# Patient Record
Sex: MALE | Race: WHITE | NOT HISPANIC OR LATINO | Employment: UNEMPLOYED
[De-identification: names, ages, dates, MRNs, and addresses within clinical notes are randomized per-mention and may not be internally consistent; named-entity substitution may affect disease eponyms.]

---

## 2017-09-12 ENCOUNTER — HOSPITAL (OUTPATIENT)
Dept: OTHER | Age: 37
End: 2017-09-12
Attending: SPECIALIST

## 2017-09-13 ENCOUNTER — APPOINTMENT (OUTPATIENT)
Dept: GENERAL RADIOLOGY | Facility: HOSPITAL | Age: 37
End: 2017-09-13
Attending: EMERGENCY MEDICINE
Payer: MEDICAID

## 2017-09-13 ENCOUNTER — HOSPITAL ENCOUNTER (EMERGENCY)
Facility: HOSPITAL | Age: 37
Discharge: HOME | End: 2017-09-13
Attending: EMERGENCY MEDICINE
Payer: MEDICAID

## 2017-09-13 VITALS
WEIGHT: 250 LBS | SYSTOLIC BLOOD PRESSURE: 118 MMHG | OXYGEN SATURATION: 100 % | TEMPERATURE: 97 F | BODY MASS INDEX: 32.08 KG/M2 | HEIGHT: 74 IN | HEART RATE: 63 BPM | RESPIRATION RATE: 17 BRPM | DIASTOLIC BLOOD PRESSURE: 71 MMHG

## 2017-09-13 DIAGNOSIS — R07.89 CHEST PAIN, ATYPICAL: Primary | ICD-10-CM

## 2017-09-13 LAB
ALBUMIN SERPL-MCNC: 4.4 G/DL (ref 3.5–4.8)
ALP LIVER SERPL-CCNC: 70 U/L (ref 45–117)
ALT SERPL-CCNC: 92 U/L (ref 17–63)
APTT PPP: 36.7 SECONDS (ref 25–34)
AST SERPL-CCNC: 35 U/L (ref 15–41)
ATRIAL RATE: 77 BPM
BASOPHILS # BLD AUTO: 0.02 X10(3) UL (ref 0–0.1)
BASOPHILS NFR BLD AUTO: 0.3 %
BILIRUB SERPL-MCNC: 1 MG/DL (ref 0.1–2)
BUN BLD-MCNC: 12 MG/DL (ref 8–20)
CALCIUM BLD-MCNC: 9.9 MG/DL (ref 8.3–10.3)
CHLORIDE: 104 MMOL/L (ref 101–111)
CO2: 29 MMOL/L (ref 22–32)
CREAT BLD-MCNC: 1.05 MG/DL (ref 0.7–1.3)
D-DIMER: <0.27 UG/ML FEU (ref 0–0.49)
EOSINOPHIL # BLD AUTO: 0.08 X10(3) UL (ref 0–0.3)
EOSINOPHIL NFR BLD AUTO: 1.1 %
ERYTHROCYTE [DISTWIDTH] IN BLOOD BY AUTOMATED COUNT: 11.3 % (ref 11.5–16)
GLUCOSE BLD-MCNC: 80 MG/DL (ref 70–99)
HCT VFR BLD AUTO: 44.1 % (ref 37–53)
HGB BLD-MCNC: 15.4 G/DL (ref 13–17)
IMMATURE GRANULOCYTE COUNT: 0.01 X10(3) UL (ref 0–1)
IMMATURE GRANULOCYTE RATIO %: 0.1 %
INR BLD: 1.03 (ref 0.89–1.11)
LIPASE: 110 U/L (ref 73–393)
LYMPHOCYTES # BLD AUTO: 2.87 X10(3) UL (ref 0.9–4)
LYMPHOCYTES NFR BLD AUTO: 40 %
M PROTEIN MFR SERPL ELPH: 8.6 G/DL (ref 6.1–8.3)
MCH RBC QN AUTO: 32.1 PG (ref 27–33.2)
MCHC RBC AUTO-ENTMCNC: 34.9 G/DL (ref 31–37)
MCV RBC AUTO: 91.9 FL (ref 80–99)
MONOCYTES # BLD AUTO: 0.52 X10(3) UL (ref 0.1–0.6)
MONOCYTES NFR BLD AUTO: 7.2 %
NEUTROPHIL ABS PRELIM: 3.68 X10 (3) UL (ref 1.3–6.7)
NEUTROPHILS # BLD AUTO: 3.68 X10(3) UL (ref 1.3–6.7)
NEUTROPHILS NFR BLD AUTO: 51.3 %
P AXIS: 57 DEGREES
P-R INTERVAL: 134 MS
PLATELET # BLD AUTO: 239 10(3)UL (ref 150–450)
POTASSIUM SERPL-SCNC: 4.2 MMOL/L (ref 3.6–5.1)
PSA SERPL DL<=0.01 NG/ML-MCNC: 13.5 SECONDS (ref 12–14.3)
Q-T INTERVAL: 372 MS
QRS DURATION: 86 MS
QTC CALCULATION (BEZET): 420 MS
R AXIS: 27 DEGREES
RBC # BLD AUTO: 4.8 X10(6)UL (ref 4.3–5.7)
RED CELL DISTRIBUTION WIDTH-SD: 38.2 FL (ref 35.1–46.3)
SODIUM SERPL-SCNC: 140 MMOL/L (ref 136–144)
T AXIS: -9 DEGREES
TROPONIN: <0.046 NG/ML (ref ?–0.05)
VENTRICULAR RATE: 77 BPM
WBC # BLD AUTO: 7.2 X10(3) UL (ref 4–13)

## 2017-09-13 PROCEDURE — 80053 COMPREHEN METABOLIC PANEL: CPT | Performed by: EMERGENCY MEDICINE

## 2017-09-13 PROCEDURE — 85730 THROMBOPLASTIN TIME PARTIAL: CPT | Performed by: EMERGENCY MEDICINE

## 2017-09-13 PROCEDURE — 99285 EMERGENCY DEPT VISIT HI MDM: CPT

## 2017-09-13 PROCEDURE — 36415 COLL VENOUS BLD VENIPUNCTURE: CPT

## 2017-09-13 PROCEDURE — 93005 ELECTROCARDIOGRAM TRACING: CPT

## 2017-09-13 PROCEDURE — 83690 ASSAY OF LIPASE: CPT | Performed by: EMERGENCY MEDICINE

## 2017-09-13 PROCEDURE — 85378 FIBRIN DEGRADE SEMIQUANT: CPT | Performed by: EMERGENCY MEDICINE

## 2017-09-13 PROCEDURE — 71010 XR CHEST AP PORTABLE  (CPT=71010): CPT | Performed by: EMERGENCY MEDICINE

## 2017-09-13 PROCEDURE — 85025 COMPLETE CBC W/AUTO DIFF WBC: CPT | Performed by: EMERGENCY MEDICINE

## 2017-09-13 PROCEDURE — 84484 ASSAY OF TROPONIN QUANT: CPT | Performed by: EMERGENCY MEDICINE

## 2017-09-13 PROCEDURE — 85610 PROTHROMBIN TIME: CPT | Performed by: EMERGENCY MEDICINE

## 2017-09-13 PROCEDURE — 93010 ELECTROCARDIOGRAM REPORT: CPT

## 2017-09-13 RX ORDER — MULTIVITAMIN
1 TABLET ORAL DAILY
COMMUNITY

## 2017-09-13 NOTE — ED PROVIDER NOTES
Patient Seen in: [facility] Emergency Department    History   Patient presents with:  Chest Pain Angina (cardiovascular)    Stated Complaint:     HPI    Patient complains chest pain.   Patient reports a sharp sensation in his chest that has been pres Vitals [09/13/17 1033]  BP: 139/86  Pulse: 73  Resp: 18  Temp: (!) 96.9 °F (36.1 °C)  Temp src: Temporal  SpO2: 100 %  O2 Device: None (Room air)    Current:/71   Pulse 63   Temp (!) 96.9 °F (36.1 °C) (Temporal)   Resp 17   Ht 188 cm (6' 2\")   Wt 11 Abnormal; Notable for the following:     RDW 11.3 (*)     All other components within normal limits   LIPASE - Normal   TROPONIN I - Normal   D-DIMER - Normal    Narrative:     FEU = Fibrinogen Equivalent Units.     D-Dimer results of less than 0.5 ug/mL (F negative    Chest x-ray  No focal consolidation. Stable chest radiograph compared to 7/6/15    On repeat examination, patient appears comfortable. Considering he has reproducible pain and now negative screening workup, I believe he may be discharged home.

## 2017-09-13 NOTE — ED INITIAL ASSESSMENT (HPI)
PT W/ CP X FEW MONTHS. HAD A NEG CHEST X-RAY YESTERDAY. PCP ORDERED PT TO COME TO ER. PAIN TO LOWER L BREAST, TIGHTNESS MOSTLY IN IN AM AND WITH ACTIVITIES.

## 2018-06-27 ENCOUNTER — HOSPITAL (OUTPATIENT)
Dept: OTHER | Age: 38
End: 2018-06-27
Attending: INTERNAL MEDICINE

## 2019-04-04 ENCOUNTER — HOSPITAL (OUTPATIENT)
Dept: OTHER | Age: 39
End: 2019-04-04

## 2019-09-20 ENCOUNTER — HOSPITAL (OUTPATIENT)
Dept: OTHER | Age: 39
End: 2019-09-20
Attending: SPECIALIST

## 2019-10-24 ENCOUNTER — HOSPITAL (OUTPATIENT)
Dept: OTHER | Age: 39
End: 2019-10-24
Attending: ORTHOPAEDIC SURGERY

## 2019-11-11 ENCOUNTER — HOSPITAL (OUTPATIENT)
Dept: OTHER | Age: 39
End: 2019-11-11
Attending: ORTHOPAEDIC SURGERY

## 2019-12-01 ENCOUNTER — HOSPITAL (OUTPATIENT)
Dept: OTHER | Age: 39
End: 2019-12-01
Attending: ORTHOPAEDIC SURGERY

## 2020-12-08 ENCOUNTER — APPOINTMENT (OUTPATIENT)
Dept: MRI IMAGING | Age: 40
End: 2020-12-08
Attending: ORTHOPAEDIC SURGERY

## 2020-12-12 ENCOUNTER — APPOINTMENT (OUTPATIENT)
Dept: MRI IMAGING | Age: 40
End: 2020-12-12
Attending: ORTHOPAEDIC SURGERY

## 2021-02-11 ENCOUNTER — HOSPITAL ENCOUNTER (EMERGENCY)
Facility: HOSPITAL | Age: 41
Discharge: HOME | End: 2021-02-11
Attending: EMERGENCY MEDICINE
Payer: MEDICAID

## 2021-02-11 VITALS
BODY MASS INDEX: 31.44 KG/M2 | TEMPERATURE: 98 F | WEIGHT: 245 LBS | HEART RATE: 64 BPM | DIASTOLIC BLOOD PRESSURE: 93 MMHG | HEIGHT: 74 IN | SYSTOLIC BLOOD PRESSURE: 135 MMHG | OXYGEN SATURATION: 96 % | RESPIRATION RATE: 16 BRPM

## 2021-02-11 DIAGNOSIS — L30.9 DERMATITIS: ICD-10-CM

## 2021-02-11 DIAGNOSIS — H65.91 MIDDLE EAR EFFUSION, RIGHT: ICD-10-CM

## 2021-02-11 DIAGNOSIS — R10.84 ABDOMINAL PAIN, GENERALIZED: Primary | ICD-10-CM

## 2021-02-11 LAB
ALBUMIN SERPL-MCNC: 4.3 G/DL (ref 3.4–5)
ALBUMIN/GLOB SERPL: 1.2 {RATIO} (ref 1–2)
ALP LIVER SERPL-CCNC: 66 U/L
ALT SERPL-CCNC: 105 U/L
ANION GAP SERPL CALC-SCNC: 4 MMOL/L (ref 0–18)
AST SERPL-CCNC: 36 U/L (ref 15–37)
BASOPHILS # BLD AUTO: 0.05 X10(3) UL (ref 0–0.2)
BASOPHILS NFR BLD AUTO: 0.7 %
BILIRUB SERPL-MCNC: 1.1 MG/DL (ref 0.1–2)
BILIRUB UR QL STRIP.AUTO: NEGATIVE
BUN BLD-MCNC: 12 MG/DL (ref 7–18)
BUN/CREAT SERPL: 14.6 (ref 10–20)
CALCIUM BLD-MCNC: 9.9 MG/DL (ref 8.5–10.1)
CHLORIDE SERPL-SCNC: 104 MMOL/L (ref 98–112)
CLARITY UR REFRACT.AUTO: CLEAR
CO2 SERPL-SCNC: 29 MMOL/L (ref 21–32)
COLOR UR AUTO: YELLOW
CREAT BLD-MCNC: 0.82 MG/DL
DEPRECATED RDW RBC AUTO: 37.6 FL (ref 35.1–46.3)
EOSINOPHIL # BLD AUTO: 0.09 X10(3) UL (ref 0–0.7)
EOSINOPHIL NFR BLD AUTO: 1.3 %
ERYTHROCYTE [DISTWIDTH] IN BLOOD BY AUTOMATED COUNT: 11.2 % (ref 11–15)
GLOBULIN PLAS-MCNC: 3.7 G/DL (ref 2.8–4.4)
GLUCOSE BLD-MCNC: 86 MG/DL (ref 70–99)
GLUCOSE UR STRIP.AUTO-MCNC: NEGATIVE MG/DL
HCT VFR BLD AUTO: 43.8 %
HGB BLD-MCNC: 15.6 G/DL
IMM GRANULOCYTES # BLD AUTO: 0.05 X10(3) UL (ref 0–1)
IMM GRANULOCYTES NFR BLD: 0.7 %
KETONES UR STRIP.AUTO-MCNC: NEGATIVE MG/DL
LEUKOCYTE ESTERASE UR QL STRIP.AUTO: NEGATIVE
LIPASE SERPL-CCNC: 79 U/L (ref 73–393)
LYMPHOCYTES # BLD AUTO: 3.02 X10(3) UL (ref 1–4)
LYMPHOCYTES NFR BLD AUTO: 43.4 %
M PROTEIN MFR SERPL ELPH: 8 G/DL (ref 6.4–8.2)
MCH RBC QN AUTO: 32.6 PG (ref 26–34)
MCHC RBC AUTO-ENTMCNC: 35.6 G/DL (ref 31–37)
MCV RBC AUTO: 91.4 FL
MONOCYTES # BLD AUTO: 0.56 X10(3) UL (ref 0.1–1)
MONOCYTES NFR BLD AUTO: 8 %
NEUTROPHILS # BLD AUTO: 3.19 X10 (3) UL (ref 1.5–7.7)
NEUTROPHILS # BLD AUTO: 3.19 X10(3) UL (ref 1.5–7.7)
NEUTROPHILS NFR BLD AUTO: 45.9 %
NITRITE UR QL STRIP.AUTO: NEGATIVE
OSMOLALITY SERPL CALC.SUM OF ELEC: 283 MOSM/KG (ref 275–295)
PH UR STRIP.AUTO: 6 [PH] (ref 4.5–8)
PLATELET # BLD AUTO: 223 10(3)UL (ref 150–450)
POTASSIUM SERPL-SCNC: 4.2 MMOL/L (ref 3.5–5.1)
PROT UR STRIP.AUTO-MCNC: NEGATIVE MG/DL
RBC # BLD AUTO: 4.79 X10(6)UL
SODIUM SERPL-SCNC: 137 MMOL/L (ref 136–145)
SP GR UR STRIP.AUTO: 1.02 (ref 1–1.03)
UROBILINOGEN UR STRIP.AUTO-MCNC: 2 MG/DL
WBC # BLD AUTO: 7 X10(3) UL (ref 4–11)

## 2021-02-11 PROCEDURE — 85025 COMPLETE CBC W/AUTO DIFF WBC: CPT | Performed by: EMERGENCY MEDICINE

## 2021-02-11 PROCEDURE — 80053 COMPREHEN METABOLIC PANEL: CPT | Performed by: EMERGENCY MEDICINE

## 2021-02-11 PROCEDURE — 99283 EMERGENCY DEPT VISIT LOW MDM: CPT

## 2021-02-11 PROCEDURE — 81001 URINALYSIS AUTO W/SCOPE: CPT | Performed by: EMERGENCY MEDICINE

## 2021-02-11 PROCEDURE — 36415 COLL VENOUS BLD VENIPUNCTURE: CPT

## 2021-02-11 PROCEDURE — 83690 ASSAY OF LIPASE: CPT | Performed by: EMERGENCY MEDICINE

## 2021-02-11 RX ORDER — MAGNESIUM HYDROXIDE/ALUMINUM HYDROXICE/SIMETHICONE 120; 1200; 1200 MG/30ML; MG/30ML; MG/30ML
30 SUSPENSION ORAL ONCE
Status: COMPLETED | OUTPATIENT
Start: 2021-02-11 | End: 2021-02-11

## 2021-02-11 RX ORDER — LIDOCAINE HYDROCHLORIDE 20 MG/ML
10 SOLUTION OROPHARYNGEAL ONCE
Status: COMPLETED | OUTPATIENT
Start: 2021-02-11 | End: 2021-02-11

## 2021-02-11 RX ORDER — PANTOPRAZOLE SODIUM 40 MG/1
40 TABLET, DELAYED RELEASE ORAL DAILY
Qty: 30 TABLET | Refills: 0 | Status: SHIPPED | OUTPATIENT
Start: 2021-02-11 | End: 2021-03-13

## 2021-02-11 RX ORDER — FLUTICASONE PROPIONATE 50 MCG
2 SPRAY, SUSPENSION (ML) NASAL DAILY
Qty: 16 G | Refills: 0 | Status: SHIPPED | OUTPATIENT
Start: 2021-02-11 | End: 2021-03-13

## 2021-02-11 NOTE — ED PROVIDER NOTES
Patient Seen in: [facility] Emergency Department      History   Patient presents with:  Abdomen/Flank Pain  Rash Skin Problem  Ear Problem Pain    Stated Complaint: abd pain, rashes, feels lip heaviness - 3 days    HPI/Subjective:   HPI    40-year-o Oropharynx is normal.  Neck: No adenopathy or thyromegaly. Lungs are clear to auscultation. Heart exam: Normal S1-S2 without extra sounds or murmurs. Regular rate and rhythm. Abdomen: NABS. Soft and nontender without masses or rebound.   Skin is dry wi component. Discharge instructions were rendered. Primary care follow-up was recommended. MDM      #1. Generalized abdominal pain, rule out gastritis versus peptic ulcer disease. 2.  Right middle ear effusion.   Nasal steroid spray and over-the-coun

## 2021-02-11 NOTE — ED INITIAL ASSESSMENT (HPI)
Patient presents with his wife who is also a patient for multiple medical complaints. Patient is complaining of constant RLQ ABD pain for 3 days that began after sneezing vigorously. Denies fever, N/V/D, constipation, urinary symptoms.   Patient is also c

## 2022-05-09 ENCOUNTER — TELEPHONE (OUTPATIENT)
Dept: CARDIOLOGY | Age: 42
End: 2022-05-09

## 2022-05-09 ENCOUNTER — HOSPITAL ENCOUNTER (OUTPATIENT)
Dept: CARDIOLOGY | Age: 42
End: 2022-05-09
Attending: SPECIALIST

## 2022-05-27 ENCOUNTER — APPOINTMENT (OUTPATIENT)
Dept: ULTRASOUND IMAGING | Age: 42
End: 2022-05-27
Attending: SPECIALIST

## 2022-06-22 ENCOUNTER — HOSPITAL ENCOUNTER (OUTPATIENT)
Dept: CARDIOLOGY | Age: 42
Discharge: HOME | End: 2022-06-22
Attending: SPECIALIST

## 2022-06-22 DIAGNOSIS — E78.5 HYPERLIPIDEMIA: ICD-10-CM

## 2022-06-22 DIAGNOSIS — R94.31 ABNORMAL EKG: ICD-10-CM

## 2022-06-22 LAB — STRESS TARGET HR: 179 BPM

## 2022-06-22 PROCEDURE — 93016 CV STRESS TEST SUPVJ ONLY: CPT | Performed by: SPECIALIST

## 2022-06-22 PROCEDURE — 93017 CV STRESS TEST TRACING ONLY: CPT

## 2022-06-22 PROCEDURE — 93018 CV STRESS TEST I&R ONLY: CPT | Performed by: SPECIALIST

## 2022-06-24 ENCOUNTER — HOSPITAL ENCOUNTER (OUTPATIENT)
Dept: ULTRASOUND IMAGING | Age: 42
Discharge: HOME | End: 2022-06-24
Attending: SPECIALIST

## 2022-06-24 DIAGNOSIS — K76.0 FATTY LIVER: ICD-10-CM

## 2022-06-24 DIAGNOSIS — R79.89 ABNORMAL LFTS: ICD-10-CM

## 2022-06-24 PROCEDURE — 76700 US EXAM ABDOM COMPLETE: CPT

## 2022-07-13 ENCOUNTER — HOSPITAL ENCOUNTER (OUTPATIENT)
Dept: NUCLEAR MEDICINE | Age: 42
Discharge: HOME | End: 2022-07-13
Attending: SPECIALIST

## 2022-07-13 DIAGNOSIS — N31.9 BLADDER DYSFUNCTION: ICD-10-CM

## 2022-07-13 PROCEDURE — 10006150 HB RX 343: Performed by: SPECIALIST

## 2022-07-13 PROCEDURE — A9537 TC99M MEBROFENIN: HCPCS | Performed by: SPECIALIST

## 2022-07-13 PROCEDURE — 78226 HEPATOBILIARY SYSTEM IMAGING: CPT

## 2022-07-13 PROCEDURE — 78227 HEPATOBIL SYST IMAGE W/DRUG: CPT

## 2022-07-13 RX ORDER — KIT FOR THE PREPARATION OF TECHNETIUM TC 99M MEBROFENIN 45 MG/10ML
6 INJECTION, POWDER, LYOPHILIZED, FOR SOLUTION INTRAVENOUS ONCE
Status: COMPLETED | OUTPATIENT
Start: 2022-07-13 | End: 2022-07-13

## 2022-07-13 RX ADMIN — MEBROFENIN 4.8 MILLICURIE: 45 INJECTION, POWDER, LYOPHILIZED, FOR SOLUTION INTRAVENOUS at 09:25

## 2024-07-24 ENCOUNTER — HOSPITAL ENCOUNTER (OUTPATIENT)
Age: 44
Discharge: HOME | End: 2024-07-24
Payer: MEDICAID

## 2024-07-24 VITALS
HEART RATE: 73 BPM | BODY MASS INDEX: 32 KG/M2 | RESPIRATION RATE: 18 BRPM | WEIGHT: 250 LBS | DIASTOLIC BLOOD PRESSURE: 86 MMHG | TEMPERATURE: 97 F | OXYGEN SATURATION: 97 % | SYSTOLIC BLOOD PRESSURE: 124 MMHG

## 2024-07-24 DIAGNOSIS — L08.9 INFECTED WOUND: ICD-10-CM

## 2024-07-24 DIAGNOSIS — R03.0 ELEVATED BLOOD PRESSURE READING WITHOUT DIAGNOSIS OF HYPERTENSION: Primary | ICD-10-CM

## 2024-07-24 DIAGNOSIS — T14.8XXA INFECTED WOUND: ICD-10-CM

## 2024-07-24 PROCEDURE — 99213 OFFICE O/P EST LOW 20 MIN: CPT | Performed by: PHYSICIAN ASSISTANT

## 2024-07-24 RX ORDER — CEPHALEXIN 500 MG/1
500 CAPSULE ORAL 4 TIMES DAILY
Qty: 28 CAPSULE | Refills: 0 | Status: SHIPPED | OUTPATIENT
Start: 2024-07-24 | End: 2024-07-31

## 2024-07-24 RX ORDER — CHLORHEXIDINE GLUCONATE 40 MG/ML
30 SOLUTION TOPICAL
Qty: 1 EACH | Refills: 0 | Status: SHIPPED | OUTPATIENT
Start: 2024-07-24

## 2024-07-24 NOTE — ED INITIAL ASSESSMENT (HPI)
Pt states he had body warts remove about 10 days ago. Pt states the procedure was done by pavan. Pt states he has an infection in a wart wound to his right rib area and right inner thigh.

## 2024-07-24 NOTE — DISCHARGE INSTRUCTIONS
Please return to the ER/clinic if symptoms worsen. Follow-up with your PCP in 24-48 hours as needed.    Recommend using the Hibiclens scrub twice weekly.  Apply the mupirocin to the open wounds.  Take the full course of antibiotics as prescribed in tandem with a probiotic daily.  Recommend getting a tetanus shot as soon as possible.  Follow the Dash dietary plan and log your blood pressures at different intervals to take to your PCP.

## 2024-07-24 NOTE — ED PROVIDER NOTES
Patient Seen in: [facility]      History     Chief Complaint   Patient presents with    Cellulitis     Stated Complaint: Removal laser wart infected area    Subjective:   HPI    43-year-old male here with what appears to be some infected areas noted to his right thigh as well as right rib cage.  Patient reports that he was in [place] 10 days ago and had multiple skin lesions lasered.  Patient is questionable about the cleanliness etc.  Patient denies any further injury or trauma.  Patient is not up-to-date with tetanus but prefers to wait to get in with his PCP.  Patient denies chest pain, shortness of breath, cough, abdominal pain, nausea, vomiting or diarrhea.  Patient denies headache or blurry vision.  Patient states that he has no history of hypertension.  Afebrile.    Objective:   Past Medical History:    Kidney stone    Other and unspecified hyperlipidemia              Past Surgical History:   Procedure Laterality Date    Lithotripsy                The patient's medication list, past medical history and social history elements  as listed in today's nurse's notes are reviewed and agree.   The patient's family history is reviewed and is noncontributory to the presenting problem, except as indicated as above.     Social History     Socioeconomic History    Marital status:    Tobacco Use    Smoking status: Former    Smokeless tobacco: Never   Vaping Use    Vaping status: Never Used   Substance and Sexual Activity    Alcohol use: Not Currently    Drug use: Not Currently              Review of Systems    Positive for stated Chief Complaint: Cellulitis    Other systems are as noted in HPI.  Constitutional and vital signs reviewed.      All other systems reviewed and negative except as noted above.    Physical Exam     ED Triage Vitals [07/24/24 1055]   BP (!) 150/99   Pulse 73   Resp 18   Temp 97.1 °F (36.2 °C)   Temp src Temporal   SpO2 97 %   O2 Device None (Room air)       Current  Vitals:   Vital Signs  BP: (!) 150/99  Pulse: 73  Resp: 18  Temp: 97.1 °F (36.2 °C)  Temp src: Temporal    Oxygen Therapy  SpO2: 97 %  O2 Device: None (Room air)            Physical Exam  Vitals and nursing note reviewed.   Constitutional:       Appearance: Normal appearance. He is well-developed.   HENT:      Head: Normocephalic.      Right Ear: External ear normal.      Left Ear: External ear normal.      Nose: Nose normal.      Mouth/Throat:      Mouth: Mucous membranes are moist.   Eyes:      Conjunctiva/sclera: Conjunctivae normal.      Pupils: Pupils are equal, round, and reactive to light.   Cardiovascular:      Rate and Rhythm: Normal rate and regular rhythm.      Heart sounds: Normal heart sounds.   Pulmonary:      Effort: Pulmonary effort is normal.      Breath sounds: Normal breath sounds.   Musculoskeletal:      Cervical back: Normal range of motion and neck supple.   Skin:     General: Skin is warm.      Capillary Refill: Capillary refill takes less than 2 seconds.      Findings: Wound present.      Comments: R ribcage: approx 2x2 cm area of erythema/mild induration: no streaking or fluctuance noted  R inner thigh: approx 3x3 cm area of erythema/mild induration: no streaking or fluctuance noted: FROM,N/V intact, strength 5/5  NOTE: Multiple other lesions that have been lasered totaling about 20 however these were the only 2 infected sites.   Neurological:      General: No focal deficit present.      Mental Status: He is alert and oriented to person, place, and time.   Psychiatric:         Mood and Affect: Mood normal.         Behavior: Behavior normal.         Thought Content: Thought content normal.         Judgment: Judgment normal.             ED Course                      MDM   Clinical Impression: infected skin lesions multiple/elevated blood pressure reading without diagnosis of HTN  Course of Treatment:   Recommend using the Hibiclens scrub twice weekly.  Apply the mupirocin to the open wounds.   Take the full course of antibiotics as prescribed in tandem with a probiotic daily.  Recommend getting a tetanus shot as soon as possible.  Follow the Dash dietary plan and log your blood pressures at different intervals to take to your PCP.        The patient is encouraged to return if any concerning symptoms arise. Additional verbal discharge instructions are given and discussed. Discharge medications are discussed. The patient is in good condition throughout the visit today and remains so upon discharge. I discuss the plan of care with the patient, who expresses understanding. All questions and concerns are addressed to the patient's satisfaction prior to discharge today.  Previous conversations with PCP and charts were reviewed.                                           Disposition and Plan     Clinical Impression:  1. Elevated blood pressure reading without diagnosis of hypertension    2. Infected wound         Disposition:  Discharge  7/24/2024 11:30 am    Follow-up:  Royer Busby MD  [address] [facility] [id number] [phone]                Medications Prescribed:  Current Discharge Medication List        START taking these medications    Details   mupirocin 2 % External Ointment Apply 1 Application topically 3 (three) times daily for 14 days.  Qty: 1 each, Refills: 0      cephalexin 500 MG Oral Cap Take 1 capsule (500 mg total) by mouth 4 (four) times daily for 7 days.  Qty: 28 capsule, Refills: 0      chlorhexidine 4 % External Solution Apply 30 mL topically twice a week.  Qty: 1 each, Refills: 0

## 2024-08-26 ENCOUNTER — APPOINTMENT (OUTPATIENT)
Dept: GENERAL RADIOLOGY | Facility: HOSPITAL | Age: 44
End: 2024-08-26
Payer: MEDICAID

## 2024-08-26 ENCOUNTER — HOSPITAL ENCOUNTER (EMERGENCY)
Facility: HOSPITAL | Age: 44
Discharge: LEFT BEFORE DISCHARGE | End: 2024-08-26
Payer: MEDICAID

## 2024-08-26 VITALS
BODY MASS INDEX: 32.08 KG/M2 | TEMPERATURE: 97 F | OXYGEN SATURATION: 95 % | DIASTOLIC BLOOD PRESSURE: 88 MMHG | HEART RATE: 66 BPM | RESPIRATION RATE: 20 BRPM | SYSTOLIC BLOOD PRESSURE: 133 MMHG | WEIGHT: 250 LBS | HEIGHT: 74 IN

## 2024-08-26 PROCEDURE — 73080 X-RAY EXAM OF ELBOW: CPT

## 2024-08-26 NOTE — ED INITIAL ASSESSMENT (HPI)
Pt in from home. Pt complains of pain in his left arm that started 3 days ago. Pt had been working out when the pain started. Pt has been icing it for 3 days. Pt reports pain the the distal upper humerus area and proximal forearm that moves up and down his arm.

## 2024-10-29 ENCOUNTER — HOSPITAL ENCOUNTER (OUTPATIENT)
Age: 44
Discharge: HOME | End: 2024-10-29
Payer: MEDICAID

## 2024-10-29 VITALS
HEART RATE: 75 BPM | RESPIRATION RATE: 18 BRPM | DIASTOLIC BLOOD PRESSURE: 86 MMHG | OXYGEN SATURATION: 97 % | SYSTOLIC BLOOD PRESSURE: 148 MMHG | BODY MASS INDEX: 33 KG/M2 | WEIGHT: 255 LBS | TEMPERATURE: 98 F

## 2024-10-29 DIAGNOSIS — B34.9 VIRAL SYNDROME: Primary | ICD-10-CM

## 2024-10-29 PROCEDURE — 99213 OFFICE O/P EST LOW 20 MIN: CPT | Performed by: PHYSICIAN ASSISTANT

## 2024-10-29 RX ORDER — PREDNISONE 20 MG/1
40 TABLET ORAL DAILY
Qty: 6 TABLET | Refills: 0 | Status: SHIPPED | OUTPATIENT
Start: 2024-10-29 | End: 2024-11-01

## 2024-10-29 NOTE — ED PROVIDER NOTES
Patient Seen in: [facility]      History     Chief Complaint   Patient presents with    Cough/URI     Stated Complaint: cold Symptoms    Subjective:   HPI    45 YO male with 2 day history of nasal congestion, cough, sore throat, upset stomach and intermittent nausea. Denies fevers. No known ill contacts. Tolerating PO. Denies vomiting or diarrhea. Patient declined viral or strep swabs. Has not tried anything for symptoms.         Objective:     No pertinent past medical history.            No pertinent past surgical history.              No pertinent social history.            Review of Systems    Positive for stated complaint: cold Symptoms  Other systems are as noted in HPI.  Constitutional and vital signs reviewed.      All other systems reviewed and negative except as noted above.    Physical Exam     ED Triage Vitals [10/29/24 1523]   /86   Pulse 75   Resp 18   Temp 98.3 °F (36.8 °C)   Temp src Temporal   SpO2 97 %   O2 Device None (Room air)       Current Vitals:   Vital Signs  BP: 148/86  Pulse: 75  Resp: 18  Temp: 98.3 °F (36.8 °C)  Temp src: Temporal    Oxygen Therapy  SpO2: 97 %  O2 Device: None (Room air)        Physical Exam  Vitals and nursing note reviewed.   Constitutional:       General: He is not in acute distress.     Appearance: Normal appearance. He is not ill-appearing, toxic-appearing or diaphoretic.   HENT:      Right Ear: Tympanic membrane is not erythematous or bulging.      Left Ear: Tympanic membrane is not erythematous or bulging.      Nose: Congestion present. No rhinorrhea.      Mouth/Throat:      Mouth: Mucous membranes are moist.      Pharynx: Posterior oropharyngeal erythema present. No pharyngeal swelling or oropharyngeal exudate.   Eyes:      Conjunctiva/sclera: Conjunctivae normal.   Cardiovascular:      Rate and Rhythm: Normal rate and regular rhythm.   Pulmonary:      Effort: Pulmonary effort is normal. No respiratory distress.      Breath sounds:  Normal breath sounds. No wheezing.   Neurological:      Mental Status: He is alert and oriented to person, place, and time.   Psychiatric:         Behavior: Behavior normal.           ED Course   Labs Reviewed - No data to display    MDM      Differential diagnosis considered but not limited to COVID, influenza, other viral illness, strep pharyngitis, less likely pneumonia    Physical exam as above.  Patient politely declined COVID, influenza and strep testing.  He is afebrile and well-appearing.  Physical exam is most consistent with viral etiology.  Supportive care discussed.  Short course of prednisone prescribed.  Signs/symptoms necessitating reevaluation discussed with understanding.        Medical Decision Making  Risk  Prescription drug management.        Disposition and Plan     Clinical Impression:  1. Viral syndrome         Disposition:  Discharge  10/29/2024  3:40 pm    Follow-up:  [facility] [address] [address]  [phone]    If symptoms worsen          Medications Prescribed:  Discharge Medication List as of 10/29/2024  3:43 PM        START taking these medications    Details   predniSONE 20 MG Oral Tab Take 2 tablets (40 mg total) by mouth daily for 3 days., Normal, Disp-6 tablet, R-0                 Supplementary Documentation:

## 2024-10-29 NOTE — ED INITIAL ASSESSMENT (HPI)
Pt with 2 days of cough, congestion, sore throat, upset stomach, and nausea. No fevers. Denies sick contacts. Able to eat and drink as normal. No vomiting no diarrhea. Declined Covid and strep testing.

## (undated) NOTE — ED AVS SNAPSHOT
Mariaa Rajendra   MRN: [id number]    Department:  [facility] Emergency Department   Date of Visit:  9/13/2017           Disclosure     Insurance plans vary and the physician(s) referred by the ER may not be covered by your plan.  Please contact If you have been prescribed any medication(s), please fill your prescription right away and begin taking the medication(s) as directed    If the emergency physician has read X-rays, these will be re-interpreted by a radiologist.  If there is a significant